# Patient Record
Sex: FEMALE | URBAN - METROPOLITAN AREA
[De-identification: names, ages, dates, MRNs, and addresses within clinical notes are randomized per-mention and may not be internally consistent; named-entity substitution may affect disease eponyms.]

---
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
## 2020-08-21 ENCOUNTER — OFFICE VISIT - RIVER FALLS (OUTPATIENT)
Dept: FAMILY MEDICINE | Facility: CLINIC | Age: 20
End: 2020-08-21

## 2022-02-12 VITALS
BODY MASS INDEX: 19.12 KG/M2 | HEART RATE: 99 BPM | HEIGHT: 64 IN | WEIGHT: 112 LBS | DIASTOLIC BLOOD PRESSURE: 54 MMHG | OXYGEN SATURATION: 99 % | SYSTOLIC BLOOD PRESSURE: 118 MMHG | TEMPERATURE: 98.6 F

## 2022-02-12 VITALS
OXYGEN SATURATION: 99 % | DIASTOLIC BLOOD PRESSURE: 66 MMHG | SYSTOLIC BLOOD PRESSURE: 110 MMHG | HEART RATE: 99 BPM | TEMPERATURE: 97.9 F

## 2022-02-15 NOTE — NURSING NOTE
Depression Screening Entered On:  9/30/2019 9:03 AM CDT    Performed On:  9/30/2019 9:02 AM CDT by Nina Romeo CMA               Depression Screening   Little Interest - Pleasure in Activities :   Not at all   Feeling Down, Depressed, Hopeless :   Not at all   Initial Depression Screen Score :   0    Trouble Falling or Staying Asleep :   Not at all   Feeling Tired or Little Energy :   Not at all   Poor Appetite or Overeating :   Not at all   Feeling Bad About Yourself :   Not at all   Trouble Concentrating :   Not at all   Moving or Speaking Slowly :   Not at all   Thoughts Better Off Dead or Hurting Self :   Not at all   Detailed Depression Screen Score :   0    Total Depression Screen Score :   0    Nina Romeo CMA - 9/30/2019 9:02 AM CDT

## 2022-02-15 NOTE — NURSING NOTE
CAGE Assessment Entered On:  9/30/2019 9:03 AM CDT    Performed On:  9/30/2019 9:02 AM CDT by Nina Romeo CMA               Assessment   Have you ever felt you should cut down on your drinking :   No   Have people annoyed you by criticizing your drinking :   No   Have you ever felt bad or guilty about your drinking :   No   Have you ever taken a drink first thing in the morning to steady your nerves or get rid of a hangover (Eye-opener) :   No   CAGE Score :   0    Nina Romeo CMA - 9/30/2019 9:02 AM CDT

## 2022-02-15 NOTE — PROGRESS NOTES
"   Patient:   MARISOL TRAN            MRN: [id number]            FIN: [id number]               Age:   19 years     Sex:  Female     :  2000   Associated Diagnoses:   Cellulitis of cheek   Author:   Minoo Anne      Visit Information      Date of Service: 2020 02:12 pm  Performing Location: [facility]  Encounter#: [id number]      Primary Care Provider (PCP):  NONE ,       Referring Provider:  Minoo Anne    NPI# [id number]      Chief Complaint   2020 2:22 PM CDT    \"boil\" on corner of mouth, left side x5 days, has not been draining, unable to get any pus out, no fever, does have a hx of MRSA        History of Present Illness   Marisol has a history of MRSA dx last year, states she had multiple visits (one here at [facility]) and multiple antibiotics and finally draining the areas relieved infection. She started to get a 'boil' on the corner of the the mouth, really in the cheek, 5 days ago. Using warm pack and tried to drain it herself. It is very painful and she wants it drained. Denies fever, is able to eat and drink      Health Status   Allergies:    Allergic Reactions (Selected)  No Known Medication Allergies   Medications:  (Selected)   Prescriptions  Prescribed  Norco 5 mg-325 mg oral tablet: See Instructions, Instructions: 1-2 tab(s) Oral q4 hrs, PRN: for pain, # 24 tab(s), 0 Refill(s), Type: Maintenance, Pharmacy: [facility], 1-2 tab(s) Oral q4 hrs,PRN:for pain, 63.8, in, ...  doxycycline monohydrate 100 mg oral capsule: = 1 cap(s) ( 100 mg ), Oral, bid, # 20 cap(s), 0 Refill(s), Type: Maintenance, Pharmacy: [facility], 1 cap(s) Oral bid,x10 day(s), 63.8, in, 19 8:16:00 CDT, Height Measured, 112, lb, 09...,    Medications          *denotes recorded medication          Norco 5 mg-325 mg oral tablet: See Instructions, 1-2 tab(s) Oral q4 " hrs, PRN: for pain, 24 tab(s), 0 Refill(s).          doxycycline monohydrate 100 mg oral capsule: 100 mg, 1 cap(s), Oral, bid, for 10 day(s), 20 cap(s), 0 Refill(s).       Problem list:    All Problems  Community acquired MRSA infection / SNOMED CT 1128282027 / Confirmed      Histories   Past Medical History:    No active or resolved past medical history items have been selected or recorded.   Family History:    No family history items have been selected or recorded.   Procedure history:    Extraction of wisdom tooth (SNOMED CT 779160626).   Social History:        Alcohol Assessment            Never      Tobacco Assessment            Never (less than 100 in lifetime)      Substance Abuse Assessment            Never      Employment and Education Assessment            Part time, Work/School description: Chiropractic Assistant.  Highest education level: High school.      Home and Environment Assessment            Lives with Family.  Injuries/Abuse/Neglect in household: No.  Feels unsafe at home: No.  Family/Friends               available for support: Yes.      Nutrition and Health Assessment            Type of diet: Regular.  Wants to lose weight: No.  Sleeping concerns: No.  Feels highly stressed: No.      Exercise and Physical Activity Assessment: Occasional exercise            Exercise frequency: 1-2 times/week.  Exercise type: Running.      Sexual Assessment            Sexually active: No.  Identifies as female, History of STD: No.  History of sexual abuse: No.        Physical Examination   VS/Measurements   General:  Alert and oriented, she has a red raised area approx 2.5 cm diameter left cheek near but not at the left corner of the mouth. There is erythema and an abrasion appearing lesion, it is firm and tender wtih palpation, no fluculant. I checked the inside of the cheek and there is not a white head or lesion or anything to make me feel this lesion started inside the mouth.  There is minimal submental or  ant cervical lymph nodes palpable.       Review / Management   Course:  I did contact ENT on call, no clinic avail for eval  i did contact ER physician at [facility] and offered pt visit there for possible CT scan and drainage  soraya spent time contacting her insurance, she was not sure where there may be coverage  I strong encouraged IM injection here and oral antibiotic and f/u in 2 days if she is not able to go to ER for further eval. She did NOT want injection antibiotic despite my encourangement, she wanted rx oral and will start that, some pain meds given but if any worseningin in next 24 hours she needs to see further eval and she agrees.       Impression and Plan   Diagnosis     Cellulitis of cheek (DLW23-UE L03.211).     Patient Instructions:       Counseled: Patient, Regarding diagnosis, Regarding treatment, Regarding medications, Verbalized understanding.    Orders     Orders (Selected)   Prescriptions  Prescribed  Norco 5 mg-325 mg oral tablet: See Instructions, Instructions: 1-2 tab(s) Oral q4 hrs, PRN: for pain, # 24 tab(s), 0 Refill(s), Type: Maintenance, Pharmacy: [facility], 1-2 tab(s) Oral q4 hrs,PRN:for pain, 63.8, in, 09/30/...  doxycycline monohydrate 100 mg oral capsule: = 1 cap(s) ( 100 mg ), Oral, bid, # 20 cap(s), 0 Refill(s), Type: Maintenance, Pharmacy: [facility], 1 cap(s) Oral bid,x10 day(s), 63.8, in, 09/30/19 8:16:00 CDT, Height Measured, 112, lb, 09....

## 2022-02-15 NOTE — PROGRESS NOTES
Patient:   DECLAN TRAN            MRN: [id number]            FIN: [id number]               Age:   19 years     Sex:  Female     :  2000   Associated Diagnoses:   Abscess of left axilla   Author:   Fortino Guzman PA-C      Procedure   Dermatology Surgical Procedure   Date/ Time:  2019 8:53:00 AM.     Confirmed: patient, procedure, side, and site are correct, safety procedures followed.     Indication: abscess.     Procedure performed: Incision and drainage.     Incision and drainage   Site: Left axilla.  11  blade.  Sterile preparation of site: sterile preparation of site with 70 % alcohol.  Anesthesia: local, 1% xylocaine, without epinephrine.  Drainage: purulent drainage, copious amount.   No repair necessary.  Sterile dressing applied.            (Inserted Image. Unable to display) .     Complications: none.     Procedure tolerated: well.     Total procedure time: 5  minutes.        Impression and Plan   Diagnosis     Abscess of left axilla (BLE21-FA L02.412).     Orders     Orders   Pharmacy:  doxycycline hyclate 100 mg oral tablet (Prescribe): = 1 tab(s) ( 100 mg ), PO, bid, x 10 day(s), Instructions: May fill with cheapest Doxy, # 20 tab(s), 0 Refill(s), Type: Acute, Pharmacy: [facility] [id number] [facility], 1 tab(s) Oral bid,x10 day(s),Instr:May fill with cheapest Doxy  Charges:  [id number] unlisted px skin muc membrane +subq tissue (Charge) (Order): Quantity: 1, Abscess of left axilla.     Just finished Bactrim for left hip abscess. FU PRN. Local cares.

## 2022-02-15 NOTE — NURSING NOTE
"Comprehensive Intake Entered On:  8/21/2020 2:27 PM CDT    Performed On:  8/21/2020 2:22 PM CDT by Candi Quiles LPN               Summary   Chief Complaint :   \"boil\" on corner of mouth, left side x5 days, has not been draining, unable to get any pus out, no fever, does have a hx of MRSA   Systolic Blood Pressure :   110 mmHg   Diastolic Blood Pressure :   66 mmHg   Mean Arterial Pressure :   81 mmHg   Peripheral Pulse Rate :   99 bpm   BP Site :   Right arm   BP Method :   Manual   Temperature Tympanic :   97.9 DegF(Converted to: 36.6 DegC)    Oxygen Saturation :   99 %   Candi Quiles LPN - 8/21/2020 2:22 PM CDT   Health Status   Allergies Verified? :   Yes   Medication History Verified? :   Yes   Medical History Verified? :   No   Pre-Visit Planning Status :   Completed   Tobacco Use? :   Never smoker   Candi Quiles LPN - 8/21/2020 2:22 PM CDT   Meds / Allergies   (As Of: 8/21/2020 2:27:00 PM CDT)   Allergies (Active)   No Known Medication Allergies  Estimated Onset Date:   Unspecified ; Created By:   Nina Romeo CMA; Reaction Status:   Active ; Category:   Drug ; Substance:   No Known Medication Allergies ; Type:   Allergy ; Updated By:   Nina Romeo CMA; Reviewed Date:   9/30/2019 8:20 AM CDT        Medication List   (As Of: 8/21/2020 2:27:00 PM CDT)        ID Risk Screen   Recent Travel History :   No recent travel   Family Member Travel History :   No recent travel   Other Exposure to Infectious Disease :   Unknown   Candi Quiles LPN - 8/21/2020 2:22 PM CDT  "

## 2022-02-15 NOTE — NURSING NOTE
Comprehensive Intake Entered On:  9/30/2019 8:20 AM CDT    Performed On:  9/30/2019 8:16 AM CDT by Nina Romeo CMA               Summary   Chief Complaint :   New patient: Staff infection under armpit x 2 weeks, starting to drain   Weight Measured :   112.0 lb(Converted to: 112 lb 0 oz, 50.80 kg)    Height Measured :   63.8 in(Converted to: 5 ft 4 in, 162.05 cm)    Body Mass Index :   19.34 kg/m2   Body Surface Area :   1.51 m2   Systolic Blood Pressure :   118 mmHg   Diastolic Blood Pressure :   54 mmHg (LOW)    Mean Arterial Pressure :   75 mmHg   Peripheral Pulse Rate :   99 bpm   BP Site :   Left arm   BP Method :   Manual   HR Method :   Electronic   Temperature Tympanic :   98.6 DegF(Converted to: 37.0 DegC)    Oxygen Saturation :   99 %   Nina Romeo CMA - 9/30/2019 8:16 AM CDT   Health Status   Allergies Verified? :   Yes   Medication History Verified? :   Yes   Medical History Verified? :   Yes   Tobacco Use? :   Never smoker   Nina Romeo CMA - 9/30/2019 8:16 AM CDT   Consents   Consent for Immunization Exchange :   Consent Granted   Consent for Immunizations to Providers :   Consent Granted   Nina Romeo CMA - 9/30/2019 8:16 AM CDT   Meds / Allergies   (As Of: 9/30/2019 8:20:39 AM CDT)   Allergies (Active)   No Known Medication Allergies  Estimated Onset Date:   Unspecified ; Created By:   Nina Romeo CMA; Reaction Status:   Active ; Category:   Drug ; Substance:   No Known Medication Allergies ; Type:   Allergy ; Updated By:   Nina Romeo CMA; Reviewed Date:   9/30/2019 8:20 AM CDT        Medication List   (As Of: 9/30/2019 8:20:39 AM CDT)   No Known Home Medications     Nina Romeo CMA - 9/30/2019 8:20:34 AM